# Patient Record
(demographics unavailable — no encounter records)

---

## 2025-01-18 NOTE — PHYSICAL EXAM
[Alert] : alert [No Acute Distress] : no acute distress [Normocephalic] : normocephalic [Anterior [place] Closed] : anterior fontanelle closed [Red Reflex Bilateral] : red reflex bilateral [PERRL] : PERRL [Normally Placed Ears] : normally placed ears [Auricles Well Formed] : auricles well formed [Clear Tympanic membranes with present light reflex and bony landmarks] : clear tympanic membranes with present light reflex and bony landmarks [No Discharge] : no discharge [Nares Patent] : nares patent [Palate Intact] : palate intact [Uvula Midline] : uvula midline [Tooth Eruption] : tooth eruption  [Supple, full passive range of motion] : supple, full passive range of motion [No Palpable Masses] : no palpable masses [Symmetric Chest Rise] : symmetric chest rise [Clear to Auscultation Bilaterally] : clear to auscultation bilaterally [Regular Rate and Rhythm] : regular rate and rhythm [S1, S2 present] : S1, S2 present [No Murmurs] : no murmurs [+2 Femoral Pulses] : +2 femoral pulses [Soft] : soft [NonTender] : non tender [Non Distended] : non distended [No Hepatomegaly] : no hepatomegaly [No Splenomegaly] : no splenomegaly [Central Urethral Opening] : central urethral opening [Testicles Descended Bilaterally] : testicles descended bilaterally [No Abnormal Lymph Nodes Palpated] : no abnormal lymph nodes palpated [Symmetric Buttocks Creases] : symmetric buttocks creases [NoTuft of Hair] : no tuft of hair [Cranial Nerves Grossly Intact] : cranial nerves grossly intact [No Rash or Lesions] : no rash or lesions

## 2025-01-18 NOTE — HISTORY OF PRESENT ILLNESS
[Mother] : mother [Normal] : Normal [Brushing teeth] : Brushing teeth [Vitamin] : Primary Fluoride Source: Vitamin [No] : No cigarette smoke exposure [de-identified] : Regular for age  [FreeTextEntry9] : Appropriate behavior for age  [de-identified] : No risks identified

## 2025-01-18 NOTE — HISTORY OF PRESENT ILLNESS
[Mother] : mother [Normal] : Normal [Brushing teeth] : Brushing teeth [Vitamin] : Primary Fluoride Source: Vitamin [No] : No cigarette smoke exposure [de-identified] : Regular for age  [FreeTextEntry9] : Appropriate behavior for age  [de-identified] : No risks identified

## 2025-01-18 NOTE — PHYSICAL EXAM
[Alert] : alert [No Acute Distress] : no acute distress [Normocephalic] : normocephalic [Anterior [place] Closed] : anterior fontanelle closed [Red Reflex Bilateral] : red reflex bilateral [PERRL] : PERRL [Normally Placed Ears] : normally placed ears [Auricles Well Formed] : auricles well formed [Clear Tympanic membranes with present light reflex and bony landmarks] : clear tympanic membranes with present light reflex and bony landmarks [No Discharge] : no discharge [Nares Patent] : nares patent [Palate Intact] : palate intact [Uvula Midline] : uvula midline [Tooth Eruption] : tooth eruption  [Supple, full passive range of motion] : supple, full passive range of motion [No Palpable Masses] : no palpable masses [Symmetric Chest Rise] : symmetric chest rise [Clear to Auscultation Bilaterally] : clear to auscultation bilaterally [Regular Rate and Rhythm] : regular rate and rhythm [S1, S2 present] : S1, S2 present [No Murmurs] : no murmurs [+2 Femoral Pulses] : +2 femoral pulses [Soft] : soft [NonTender] : non tender [Non Distended] : non distended [No Hepatomegaly] : no hepatomegaly [No Splenomegaly] : no splenomegaly [Central Urethral Opening] : central urethral opening [Testicles Descended Bilaterally] : testicles descended bilaterally [No Abnormal Lymph Nodes Palpated] : no abnormal lymph nodes palpated [Symmetric Buttocks Creases] : symmetric buttocks creases [NoTuft of Hair] : no tuft of hair [Cranial Nerves Grossly Intact] : cranial nerves grossly intact [No Rash or Lesions] : no rash or lesions

## 2025-02-13 NOTE — PHYSICAL EXAM
[Clear Rhinorrhea] : clear rhinorrhea [Erythematous Oropharynx] : erythematous oropharynx [NL] : regular rate and rhythm, normal S1, S2 audible, no murmurs [Conjuctival Injection] : no conjunctival injection

## 2025-02-13 NOTE — HISTORY OF PRESENT ILLNESS
[FreeTextEntry6] : Fever since this AM, Tm 102F.  Two episodes of emesis.  Taking fluids.  Last got Tylenol at 4pm.  Per father, whole family was sick last month. Had OM last month as well.  Does attend .

## 2025-04-25 NOTE — PHYSICAL EXAM
[Clear Rhinorrhea] : clear rhinorrhea [NL] : warm, clear [FreeTextEntry3] : TMs are little dull bilaterally [de-identified] : Throat is minimally red [FreeTextEntry7] : Lungs clear bilaterally with good air entry

## 2025-04-25 NOTE — HISTORY OF PRESENT ILLNESS
[FreeTextEntry6] : Patient has URI signs and symptoms.  Dad has strep throat and wants the child checked for strep.  The child is in good spirits.